# Patient Record
(demographics unavailable — no encounter records)

---

## 2025-03-13 NOTE — PHYSICAL EXAM
[No Acute Distress] : no acute distress [Normal] : normal rate, regular rhythm, normal S1 and S2 and no murmur heard [No Focal Deficits] : no focal deficits [Normal Affect] : the affect was normal [Normal Insight/Judgement] : insight and judgment were intact [de-identified] : pt is extremely worried [de-identified] : left shoulder with small swelling at inferior aspect of wound , non tender, mild redness. Limited ROM

## 2025-03-13 NOTE — HISTORY OF PRESENT ILLNESS
[FreeTextEntry8] : Pt here as she has a skin issue on left arm close to her surgical scar.  Her surgery on left arm was November 2023. Then in November of 2024 she developed a skin lesion at the inferior aspect of her scar . Initially it was redness and flat. Then it started to become nodular and keeps growing. No fever. + itchiness. mild pain. Sometimes she moves her arm quickly or suddenly and it feels uncomfortable. She does have limitation in movement.  She had MRI done at [facility] about 3 weeks ago.  Dr Lobo is her surgeon.  She has seen him on 2/26/25.

## 2025-03-13 NOTE — HEALTH RISK ASSESSMENT
[No] : In the past 12 months have you used drugs other than those required for medical reasons? No [No falls in past year] : Patient reported no falls in the past year [0] : 2) Feeling down, depressed, or hopeless: Not at all (0) [PHQ-2 Negative - No further assessment needed] : PHQ-2 Negative - No further assessment needed [Never] : Never [[id number]] : 0

## 2025-03-20 NOTE — HISTORY OF PRESENT ILLNESS
[FreeTextEntry1] : Mar 20, 2025    Re: Deyanira Peters  1964   in person  Thursday - called yesterday for pre-op clearance for L shoulder apparatus  PCP: Dr. Alberta Castellano             Gyn: Dr. JAMARI Mccarthy             Allergy:  Dr. Yu (asthma)             Derm:  Dr. Jenkins (rosacea)              .            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 GIUSEPPE Grullon  followed by 155 mCi I-131 2013 Dr. Shafer                         Pathology report from [facility] with review by Dr. Zavala scanned in.              2014: U/S Thyroid ([place]): negative             2015: Thyrogen study: neg scan; Tg < 0             2016: U/S Thyroid ([place]): neg             2018: Thyroglobulin < 1.8, abs neg; TSH 0.05             (Hx H. pylori - on Vit D and B12 shots)               3/15/23 US neck:  Dr. Martin bilateral cervical LN  3 oon R and 1 on L--"correlate with Tg levels...."                9/15/23 US neck:   Dr. Jennings:  no evidence of residual or recurrent neoplasm                10/2023  Tg undetectable    3/14/2024 TSH 1.8                24:   US neck ([place]) - Dr. Tony Da Silva - no evidence of local recurrence.                24  Tg < 0.1;  TSH 2.73   61 yo  Hypothyroid post thyroidectomy (2012 at [facility]) for thyroid cancer (follicular)  - surgical report below.      Path:    DIAGNOSIS THYROID: RIGHT HEMITHYROIDECTOMY: - FOLLICULAR CARCINOMA. - THYROID PAPILLARY MICROCARCINOMA, TWO FOCI. - LYMPHOCYTIC THYROIDITIS. - PARATHYROID GLAND WITHIN NORMAL LIMITS. - LYMPH NODES, PERITHYROIDAL, NEGATIVE FOR TUMOR. NOTE: Upon examination of the case at [facility], the grossly nodular lesion was felt to be a follicular neoplasm, but it is uncertain as to whether this was follicular carcinoma or an adenoma with pseudoinvasion. Other findings included at least one focus of papillary microcarcinoma, lymphocytic thyroiditis, a parathyroid gland, and three negative perithyroidal lymph nodes. The case was sent in consultation to Dr. Tyler Zavala at [facility]. Dr. Zavala s consultation diagnoses are as above. His consultation letter ([id number]) also included the following: Dear Dr. Pennington: This letter confirms our telephone conversation on 2012. I have received and reviewed the 24 hematoxylin and eosin stained slides that were sent to me in consultation from your case WS- on the above named patient. As we discussed, the submitted slides from this patient s right thyroid lobe resection show a dominant encapsulated follicular epithelial cell lesion stated to measure 2.7 cm in greatest dimension with invasive growth but absence of nuclear features diagnostic for thyroid papillary carcinoma. The invasive growth includes a portion of the lesion separate from the main mass that either represents capsular invasion or possibly given its round configuration is lymph-vascular invasion. In any event, I do interpret this separate focus of the lesion to be invasive growth. In other areas there are irregularities in growth along the tumor-to-capsule interface with at least one other focus worrisome for capsular invasion but overall there are no additional foci of invasive growth. I agree with you that there are many diagnostic challenges in thyroid gland pathology, including (but not limited to) what constitutes invasive growth but for reasons previously cited I do feel this patient s follicular neoplasm is invasive, hence, the diagnosis of follicular carcinoma. In the submitted slides the carcinoma appears confined to the thyroid gland without evidence of extrathyroidal invasion. In the slides labeled 7 and 9 there are incidentally identified tiny foci of papillary microcarcinoma measuring 1 mm and 0.4 mm, respectively, characterized by diagnostic nuclear features for papillary carcinoma, including enlarged nuclei with variation in size and shape, clear to dispersed (very fine) appearing nuclear chromatin, nuclear crowding".       Asthma:          c/o Asthma F/U denies any asthma sxs. Taking Advair only as needed - does not want to take on regular basis.     .On levothyroxine  lowered to 88 mcg daily.but none on Sundays:   6 pills a week. Feels well.  Anticipates need for surgery on L shoulder apparatus.  2023 required surgery L shoulder - at [facility]  Dr. Chandler Lobo  p   (co-ordinator Mag ) but starting around 2024 she started to note discomfort.  She has been told the apparatus will need to be removed - at [facility] and is scheduled for 3/25/2025.  : : Constitutional:  Alert, well nourished, healthy appearance, no acute distress  Eyes:  No proptosis, no stare Thyroid: no palpable tissue Pulmonary:  No respiratory distress, no accessory muscle use; normal rate and effort Cardiac:  Normal rate Vascular:  Endocrine:  No stigmata of Cushings Syndrome Musculoskeletal:  Normal gait, no involuntary movements Neurology:  No tremors Affect/Mood/Psych:  Oriented x 3; normal affect, normal insight/judgement, normal mood  . Impression:  Hypothyroidism is well controlled on levothyroxine 88 mcg x 6 days a week. No evidence for return of thyroid cancer.  Plan:  Thyroid function tests today. No endocrine contraindication to planned orthopedic surgery. She will also go for general medical clearance and ROV here by 2026.      Follow up US neck after that.  This note faxed to       2024      in person PCP: Dr. Alberta Castellano             Gyn: Dr. JAMARI Mccarthy             Allergy:  Dr. Yu (asthma)             Derm:  Dr. Jenkins (rosacea)              .            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 GIUSEPPE Grullon  followed by 155 mCi I-131 2013 Dr. Shafer                         Pathology report from [facility] with review by Dr. Zavala scanned in.              2014: U/S Thyroid ([place]): negative             2015: Thyrogen study: neg scan; Tg < 0             2016: U/S Thyroid ([place]): neg             2018: Thyroglobulin < 1.8, abs neg; TSH 0.05             (Hx H. pylori - on Vit D and B12 shots)              10/2023  Tg undetectable    3/14/2024 TSH 1.8                2024  US neck ([place]) reassuring   . 61 yo  Hypothyroid post thyroidectomy (2012 at [facility]) for thyroid cancer (follicular)   On levothyroxine  lowered to 88 mcg daily.but none on Sundays:   6 pills a week. Feels well.    . . Went for follow up ultrasound of neck at [place] on 3/16/22:  "FINDINGS: Status post total thyroidectomy with no evidence of residual thyroid tissue or recurrent tumor at the thyroidectomy bed.  On the right, since the prior study the patient has developed 2 right-sided proximal cervical hypoechoic and likely necrotic lymph nodes, one measuring 0.6 x 0.3 cm (short to long axis ratio 0.5) with no central fatty hilum or vascularity. An adjacent smaller necrotic lymph node measures 0.5 x 0.3 cm (short to long axis ratio of 0.6) with no vascularity or central fatty hilum..In addition, there is a new morphology normal oblonged 1.4 x 0.3 cm right mid cervical lymph node with a normal central fatty hilum.  On the left, since the prior study the patient has developed an oblonged the hypoechoic 0.7 x 0.2 cm lymph node with a central fatty hilum.   IMPRESSION: Interval development of bilateral cervical lymph nodes, 3 on the right neck and 1 on the left side. 2 of the right-sided lymph nodes have somewhat concerning features. Correlation with serum thyroglobulin levels is recommended. Consider I-131 whole body scan, US FNA, or short term follow up US in 3 months depending on the degree of clinical and laboratory suspicion for metastasis.  --- End of Report ---  ***Electronically Signed *** ----------------------------------------------- Medhat Martin MD              23"  2024 US neck at [place]  "interval stability - no suspicious adenopathy...."     : : Constitutional:  Alert, well nourished, healthy appearance, no acute distress  Eyes:  No proptosis, no stare Thyroid: Pulmonary:  No respiratory distress, no accessory muscle use; normal rate and effort Cardiac:  Normal rate Vascular:  Endocrine:  No stigmata of Cushings Syndrome Musculoskeletal:  Normal gait, no involuntary movements Neurology:  No tremors Affect/Mood/Psych:  Oriented x 3; normal affect, normal insight/judgement, normal mood  . Impression:  Doing very well. Plan:  Same Rx. Tg and TSH today and ROV in March       Oct 27, 2023   in person accompanied by her   PCP: Dr. Alberta Castellano             Gyn: Dr. JAMARI Mccarthy             Allergy:  Dr. Yu (asthma)             Derm:  Dr. Jenkins (rosacea)              .            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 GIUSEPPE Grullon  followed by 155 mCi I-131 2013 Dr. Shafer                         Pathology report from [facility] with review by Dr. Zavala scanned in.              2014: U/S Thyroid ([place]): negative             2015: Thyrogen study: neg scan; Tg < 0             2016: U/S Thyroid ([place]): neg             2018: Thyroglobulin < 1.8, abs neg; TSH 0.05             (Hx H. pylori - on Vit D and B12 shots) . Hypothyroid post thyroidectomy for thyroid cancer. On levothyroxine  lowered to 88 mcg daily.but none on Sundays:   6 pills a week. Feels well.    . . Went for follow up ultrasound of neck at [place] on 3/16/22:  "FINDINGS: Status post total thyroidectomy with no evidence of residual thyroid tissue or recurrent tumor at the thyroidectomy bed.  On the right, since the prior study the patient has developed 2 right-sided proximal cervical hypoechoic and likely necrotic lymph nodes, one measuring 0.6 x 0.3 cm (short to long axis ratio 0.5) with no central fatty hilum or vascularity. An adjacent smaller necrotic lymph node measures 0.5 x 0.3 cm (short to long axis ratio of 0.6) with no vascularity or central fatty hilum..In addition, there is a new morphology normal oblonged 1.4 x 0.3 cm right mid cervical lymph node with a normal central fatty hilum.  On the left, since the prior study the patient has developed an oblonged the hypoechoic 0.7 x 0.2 cm lymph node with a central fatty hilum.   IMPRESSION: Interval development of bilateral cervical lymph nodes, 3 on the right neck and 1 on the left side. 2 of the right-sided lymph nodes have somewhat concerning features. Correlation with serum thyroglobulin levels is recommended. Consider I-131 whole body scan, US FNA, or short term follow up US in 3 months depending on the degree of clinical and laboratory suspicion for metastasis.  --- End of Report ---  ***Electronically Signed *** ----------------------------------------------- Medhat Martin MD              23"  -- In 2023, referred to [place] for FNAB (if deemed appropriate by Dr. Jennings)  of cervical lymph nodes noted in report. Her  explains that she did not go because "she was fearing the worst") She did go for a f/u US neck 9/15/2023 and US report was more reassuring.  : : Constitutional:  Alert, well nourished, healthy appearance, no acute distress  Eyes:  No proptosis, no stare Thyroid: Pulmonary:  No respiratory distress, no accessory muscle use; normal rate and effort Cardiac:  Normal rate Vascular:  Endocrine:  No stigmata of Cushings Syndrome Musculoskeletal:  Normal gait, no involuntary movements Neurology:  No tremors Affect/Mood/Psych:  Oriented x 3; normal affect, normal insight/judgement, normal mood  .  Impression:  Even with undetectable Tg and more reassuring US, I will ask Radiology to review to see if FNAB is still advised and check thyroglobulin level again today.   Plan:  Continued surveillance.     Mar 17, 2023    in person accompanied by her   PCP: Dr. Alberta Castellano             Gyn: Dr. JAMARI Mccarthy             Allergy:  Dr. Yu (asthma)             Derm:  Dr. Jenkins (rosacea)              .            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 GIUSEPPE Grullon  followed by 155 mCi I-131 2013 Dr. Shafer                         Pathology report from [facility] with review by Dr. Zavala scanned in.              2014: U/S Thyroid ([place]): negative             2015: Thyrogen study: neg scan; Tg < 0             2016: U/S Thyroid ([place]): neg             2018: Thyroglobulin < 1.8, abs neg; TSH 0.05             (Hx H. pylori - on Vit D and B12 shots) . Hypothyroid post thyroidectomy for thyroid cancer. On levothyroxine  lowered to 88 mcg daily.but none on Sundays:   6 pills a week. Feels well.    . . Went for follow up ultrasound of neck at [place] on 3/16/22:  "FINDINGS: Status post total thyroidectomy with no evidence of residual thyroid tissue or recurrent tumor at the thyroidectomy bed.  On the right, since the prior study the patient has developed 2 right-sided proximal cervical hypoechoic and likely necrotic lymph nodes, one measuring 0.6 x 0.3 cm (short to long axis ratio 0.5) with no central fatty hilum or vascularity. An adjacent smaller necrotic lymph node measures 0.5 x 0.3 cm (short to long axis ratio of 0.6) with no vascularity or central fatty hilum..In addition, there is a new morphology normal oblonged 1.4 x 0.3 cm right mid cervical lymph node with a normal central fatty hilum.  On the left, since the prior study the patient has developed an oblonged the hypoechoic 0.7 x 0.2 cm lymph node with a central fatty hilum.   IMPRESSION: Interval development of bilateral cervical lymph nodes, 3 on the right neck and 1 on the left side. 2 of the right-sided lymph nodes have somewhat concerning features. Correlation with serum thyroglobulin levels is recommended. Consider I-131 whole body scan, US FNA, or short term follow up US in 3 months depending on the degree of clinical and laboratory suspicion for metastasis.  --- End of Report ---  ***Electronically Signed *** ----------------------------------------------- Medhat Martin MD              23"  : : Constitutional:  Alert, well nourished, healthy appearance, no acute distress  Eyes:  No proptosis, no stare Thyroid: no palpable thyroid tissue or adenopathy Pulmonary:  No respiratory distress, no accessory muscle use; normal rate and effort Cardiac:  Normal rate Vascular:  Endocrine:  No stigmata of Cushing's Syndrome Musculoskeletal:  Normal gait, no involuntary movements Neurology:  No tremors Affect/Mood/Psych:  Oriented x 3; normal affect, normal insight/judgement, normal mood  .    . Based on report with advice for FNAB, patient is concerned.   Thyroglobulin levels remain undetectable and will be repeated today. Request for FNAB sent to Radiology with US report. Keep f/u appoiintment here.     Sep 09, 2022     Videochat using [employer]   PCP: Dr. Alberta Castellano             Gyn: Dr. JAMARI Mccarthy             Allergy:  Dr. Yu (asthma)             Derm:  Dr. Jenkins (rosacea)              .            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 GIUSEPPE Grullon  followed by 155 mCi I-131 2013 Dr. Shafer                         Pathology report from [facility] with review by Dr. Zavala scanned in.              2014: U/S Thyroid ([place]): negative             2015: Thyrogen study: neg scan; Tg < 0             2016: U/S Thyroid ([place]): neg             2018: Thyroglobulin < 1.8, abs neg; TSH 0.05             (Hx H. pylori - on Vit D and B12 shots) . Hypothyroid post thyroidectomy for thyroid cancer. On levothyroxine  lowered to 88 mcg daily.but none on Sundays:   6 pills a week. Feels well.     Plan:  Updated TFTs, Tg; TgAb, US neck   2022     iPhone  PCP: Dr. Alberta Castellano             Gyn: Dr. JAMARI Mccarthy             Allergy:  Dr. Yu (asthma)             Derm:  Dr. Jenkins (rosacea)              .            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 GIUSEPPE Grullon  followed by 155 mCi I-131 2013 Dr. Shafer                         Pathology report from [facility] with review by Dr. Zavala scanned in.              2014: U/S Thyroid ([place]): negative             2015: Thyrogen study: neg scan; Tg < 0             2016: U/S Thyroid ([place]): neg             2018: Thyroglobulin < 1.8, abs neg; TSH 0.05             (Hx H. pylori - on Vit D and B12 shots) . Hypothyroid post thyroidectomy for thyroid cancer. On levothyroxine  lowered to 88 mcg daily.- two visits ago.    Impression:  TSH is improved (higher)  Plan:  Continue levothyroxine at 88 mcg daily for now. Updated TFTs, Tg prior to next visit in six months.     Sep 17, 2021      telephone  PCP: Dr. Alberta Castellano             Gyn: Dr. JAMARI Mccarthy             Allergy:  Dr. Yu (asthma)             Derm:  Dr. Jenkins (rosacea)              .            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 GIUSEPPE Grullon  followed by 155 mCi I-131 2013 Dr. Shafer                         Pathology report from [facility] with review by Dr. Zavala scanned in.              2014: U/S Thyroid ([place]): negative             2015: Thyrogen study: neg scan; Tg < 0             2016: U/S Thyroid ([place]): neg             2018: Thyroglobulin < 1.8, abs neg; TSH 0.05             (Hx H. pylori - on Vit D and B12 shots) . Hypothyroid post thyroidectomy for thyroid cancer. On levothyroxine 100 daily until last visit when dose lowered to 88 mcg daily.  Lowered dose of L-T4 from 100 to 88 and TSH still suppressed so will only take 6 days a week (avg 75 mcg) and update labs at [place] in  and .  -[facility] 2021    in person    vaccinated   Plan:  As above.   Udpated labs January and  and then consider f/u US neck, BD.   PCP: Dr. Alberta Castellano             Gyn: Dr. JAMARI Mccarthy             Allergy:  Dr. Yu (asthma)             Derm:  Dr. Jenkins (rosacea)              .            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 GIUSEPPE Grullon  followed by 155 mCi I-131 2013 Dr. Shafer             2014: U/S Thyroid ([place]): neg             2015: Thyrogen study: neg scan; Tg < 0             2016: U/S Thyroid ([place]): neg             2018: Thyroglobulin < 1.8, abs neg; TSH 0.05             (Hx H. pylori - on Vit D and B12 shots) . Hypothyroid post thyroidectomy for thyroid cancer. On levothyroxine 100 daily.   TSH in Feb was 0.09  Labs from [facility] in 2020 included TSH 0.05 T4 14.6 Tg < 0.1 Tg Ab < 1  Plan:   Lower dose of SUNSHINE Synthroid from 100 mcg daily to 88 mcg daily and repeat labs before Sept visit.       2020     [email or web address]  [phone]   PCP: Dr. Dalia Salas             Gyn: Dr. JAMARI Mccarthy             Allergy:  Dr. Yu (asthma)             Derm:  Dr. Jenkins (rosacea)              .            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 GIUSEPPE Grullon  followed by 155 mCi I-131 2013 Dr. Shafer             2014: U/S Thyroid ([place]): neg             2015: Thyrogen study: neg scan; Tg < 0             2016: U/S Thyroid ([place]): neg             2018: Thyroglobulin < 1.8, abs neg; TSH 0.05             (Hx H. pylori - on Vit D and B12 shots) . Hypothyroid post thyroidectomy for thyroid cancer. On levothyroxine 100 daily.   TSH in Feb was 0.09  19 thyroglobulin undetectable, thyroglobulin antibody negative.    Impression:  Bone density in 2018 in good range. Can repeat as she is on suppressive dose o flevothyroxine  Plan:   ROV wihin 4 months.  after Tg, TFTs, bone density   May 29, 2020     VideoChat   Facetime    PCP: Dr. Dalia Salas             Gyn: Dr. JAMARI Mccarthy             Allergy:  Dr. Yu (asthma)             Derm:  Dr. Jenkins (rosacea)              .            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 GIUSEPPE Grullon  followed by 155 mCi I-131 2013 Dr. Shafer             2014: U/S Thyroid ([place]): neg             2015: Thyrogen study: neg scan; Tg < 0             2016: U/S Thyroid ([place]): neg             2018: Thyroglobulin < 1.8, abs neg; TSH 0.05             (Hx H. pylori - on Vit D and B12 shots) . Hypothyroid post thyroidectomy for thyroid cancer. On levothyroxine 100 daily.   TSH in Feb was 0.09  Impression:  Bone density 2018 in good range. Time for update in August. May be possible to lower dose of levothyroxine  ROV in September.      2019    PCP: Dr. Dalia Salas             Gyn: Dr. JAMARI Mccarthy             Allergy:  Dr. Yu (asthma)             Derm:  Dr. Jenkins (rosacea)              .            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 GIUSEPPE Grullon             followed by 155 mCi I-131             2014: U/S Thyroid ([place]): neg             2015: Thyrogen study: neg scan; Tg < 0             2016: U/S Thyroid ([place]): neg             2018: Thyroglobulin < 1.8, abs neg; TSH 0.05             (Hx H. pylori - on Vit D and B12 shots) . Bone density 18  spine t -0.5    TH -0.9  FN  -1.8     After last visit, levothryoxine lowered to 112 mcg daily.  (down from ~117 avt) She oliverio "off" for a while, but now feels better. Follow up TFTs August 15 showed TSH 0.04 (had been 0.02 in May) so room to lower dose further to 100 mcg daily.  Plan:  TFTs today and lower dose to 112 mcg daily and                              [facility] [employer] [facility] in 6 months.     May 31, 2019    PCP: Dr. Dalia Salas             Gyn: Dr. JAMARI Mccarthy            .            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 GIUSEPPE Grullon             followed by 155 mCi I-131             2014: U/S Thyroid ([place]): neg             2015: Thyrogen study: neg scan; Tg < 0             2016: U/S Thyroid ([place]): neg             2018: Thyroglobulin < 1.8, abs neg; TSH 0.05             (Hx H. pylori - on Vit D and B12 shots) . Takes levothyroxine 137 mcg x 6 tabs daily.  (avg 117 mcg daily)    Plan:  Change to 112 mcg x 7 days a week

## 2025-04-11 NOTE — HISTORY OF PRESENT ILLNESS
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 Thyrogen study: neg scan; Tg < 0             2016: U/S Thyroid ([place]): neg             2018: Thyroglobulin < 1.8, abs neg; TSH 0.05             (Hx H. pylori - on Vit D and B12 shots) . Hypothyroid post thyroidectomy for thyroid cancer. On levothyroxine 100 daily.   TSH in b was 0.09  Impression:  Bone density 2018 in good range. Time for update in August. May be possible to lower dose of levothyroxine  ROV in September.      2019    PCP: Dr. Dalia Salas             Gyn: Dr. JAMARI Mccarthy             Allergy:  Dr. Yu (asthma)             Derm:  Dr. Jenkins (rosacea)              .            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 GIUSEPPE Grullon             followed by 155 mCi I-131             2014: U/S Thyroid ([place]): neg             2015: Thyrogen study: neg scan; Tg < 0             2016: U/S Thyroid ([place]): neg             2018: Thyroglobulin < 1.8, abs neg; TSH 0.05             (Hx H. pylori - on Vit D and B12 shots) . Bone density 18  spine t -0.5    TH -0.9  FN  -1.8     After last visit, levothryoxine lowered to 112 mcg daily.  (down from ~117 avt) She oliverio "off" for a while, but now feels better. Follow up TFTs August 15 showed TSH 0.04 (had been 0.02 in May) so room to lower dose further to 100 mcg daily.  Plan:  TFTs today and lower dose to 112 mcg daily and                              [facility] [employer] [facility] in 6 months.     May 31, 2019    PCP: Dr. Dalia Salas             Gyn: Dr. JAMARI Mccarthy            .            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 GIUSEPPE Grullon             followed by 155 mCi I-131             2014: U/S Thyroid ([place]): neg             2015: Thyrogen study: neg scan; Tg < 0             2016: U/S Thyroid ([place]): neg             2018: Thyroglobulin < 1.8, abs neg; TSH 0.05             (Hx H. pylori - on Vit D and B12 shots) . Takes levothyroxine 137 mcg x 6 tabs daily.  (avg 117 mcg daily)    Plan:  Change to 112 mcg x 7 days a week

## 2025-04-11 NOTE — HISTORY OF PRESENT ILLNESS
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 study: neg scan; Tg < 0             2016: U/S Thyroid ([place]): neg             2018: Thyroglobulin < 1.8, abs neg; TSH 0.05             (Hx H. pylori - on Vit D and B12 shots) . Hypothyroid post thyroidectomy for thyroid cancer. On levothyroxine 100 daily.   TSH in b was 0.09  Impression:  Bone density 2018 in good range. Time for update in August. May be possible to lower dose of levothyroxine  ROV in September.      2019    PCP: Dr. Dalia Salas             Gyn: Dr. JAMARI Mccarthy             Allergy:  Dr. Yu (asthma)             Derm:  Dr. Jenkins (rosacea)              .            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 GIUSEPPE Grullon             followed by 155 mCi I-131             2014: U/S Thyroid ([place]): neg             2015: Thyrogen study: neg scan; Tg < 0             2016: U/S Thyroid ([place]): neg             2018: Thyroglobulin < 1.8, abs neg; TSH 0.05             (Hx H. pylori - on Vit D and B12 shots) . Bone density 18  spine t -0.5    TH -0.9  FN  -1.8     After last visit, levothryoxine lowered to 112 mcg daily.  (down from ~117 avt) She oliverio "off" for a while, but now feels better. Follow up TFTs August 15 showed TSH 0.04 (had been 0.02 in May) so room to lower dose further to 100 mcg daily.  Plan:  TFTs today and lower dose to 112 mcg daily and                              [facility] [employer] [facility] [place] [facility] in 6 months.     May 31, 2019    PCP: Dr. Dalia Salas             Gyn: Dr. JAMARI Mccarthy            .            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 GIUSEPPE Grullon             followed by 155 mCi I-131             2014: U/S Thyroid ([place]): neg             2015: Thyrogen study: neg scan; Tg < 0             2016: U/S Thyroid ([place]): neg             2018: Thyroglobulin < 1.8, abs neg; TSH 0.05             (Hx H. pylori - on Vit D and B12 shots) . Takes levothyroxine 137 mcg x 6 tabs daily.  (avg 117 mcg daily)    Plan:  Change to 112 mcg x 7 days a week

## 2025-04-16 NOTE — HISTORY OF PRESENT ILLNESS
[FreeTextEntry1] : follow up visit [de-identified] : Pt had the hardware removed from her shoulder. Now feels much better. She has good ROM. She will be starting PT next week. She saw endo last Thursday and her tft's were checked.  Her synthroid was increased to 6.5 days per week. She has been eating an improved diet and she exercises a lot overall. Here to repeat lipid profile.

## 2025-04-16 NOTE — HEALTH RISK ASSESSMENT
[Never (0 pts)] : Never (0 points) [No] : In the past 12 months have you used drugs other than those required for medical reasons? No [No falls in past year] : Patient reported no falls in the past year [0] : 2) Feeling down, depressed, or hopeless: Not at all (0) [PHQ-2 Negative - No further assessment needed] : PHQ-2 Negative - No further assessment needed [Never] : Never [Audit-CScore] : 0 [de-identified] : Pt states she does cardio. [de-identified] : Pt states she considers her diet ormal. [[id number]] : 0

## 2025-04-16 NOTE — PHYSICAL EXAM
[Normal] : normal rate, regular rhythm, normal S1 and S2 and no murmur heard [No Focal Deficits] : no focal deficits [Speech Grossly Normal] : speech grossly normal [Normal Affect] : the affect was normal [Alert and Oriented x3] : oriented to person, place, and time [Normal Mood] : the mood was normal [Normal Insight/Judgement] : insight and judgment were intact [de-identified] : left shoulder with surgical scar and steristrips are still attached, healing well

## 2025-05-08 NOTE — HISTORY OF PRESENT ILLNESS
[FreeTextEntry1] : 60yo  postmenopausal without HRT  here for annual Gyn exam. Has no Gyn complaints.Had colposcopy 24 after Pap Negative but HPV + x 2 years (non 16, 18/45)-> benign  Had hardware removed from shoulder. Deyanira is S/P total thyroidectomy for thyroid Ca.      [Mammogramdate] : 3/27/24 [TextBox_19] :  Left BIRADS 1 10/5/23 BIRADS 0D asymmetry L breast  heterogeneously dense T-C Risk 7.4% [BreastSonogramDate] : 3/27/24 [TextBox_25] : BIRADS 1 [PapSmeardate] : 4/4/24 [TextBox_31] : Neg/HPV +(non 16, 18/45) [BoneDensityDate] : 10/5/23 [TextBox_37] :  T Score Spine -1.1 Hip -1.3 Fem Neck -1.9 [ColonoscopyDate] : 11/7/24 [TextBox_43] : premalignant polyps x 2 at appendix and rectum

## 2025-06-19 NOTE — PROCEDURE
[Colposcopy] : Colposcopy  [Time out performed] : Pre-procedure time out performed.  Patient's name, date of birth and procedure confirmed. [Consent Obtained] : Consent obtained [HPV High Risk] : HPV high risk [No Premedication] : no premedication [Colposcopy Adequate] : colposcopy adequate [ECC Performed] : ECC performed [Pap Performed] : pap not performed [SCI Fully Visualized] : SCI not fully visualized [No Abnormalities] : no abnormalities [Biopsy] : biopsy taken [de-identified] : 1 [de-identified] : 4

## 2025-06-19 NOTE — PROCEDURE
[Colposcopy] : Colposcopy  [Time out performed] : Pre-procedure time out performed.  Patient's name, date of birth and procedure confirmed. [Consent Obtained] : Consent obtained [HPV High Risk] : HPV high risk [No Premedication] : no premedication [Colposcopy Adequate] : colposcopy adequate [ECC Performed] : ECC performed [Pap Performed] : pap not performed [SCI Fully Visualized] : SCI not fully visualized [No Abnormalities] : no abnormalities [Biopsy] : biopsy taken [de-identified] : 1 [de-identified] : 4

## 2025-06-19 NOTE — HISTORY OF PRESENT ILLNESS
[FreeTextEntry1] : 60yo  postmenopausal without HRT here for Colposcopy. Pap 25 NILM/+ HPV(non 16,18/45) . Had colposcopy 24 after Pap Negative but HPV + x 2 years (non 16, 18/45)-> benign